# Patient Record
Sex: MALE | Race: WHITE | NOT HISPANIC OR LATINO | Employment: FULL TIME | URBAN - METROPOLITAN AREA
[De-identification: names, ages, dates, MRNs, and addresses within clinical notes are randomized per-mention and may not be internally consistent; named-entity substitution may affect disease eponyms.]

---

## 2021-04-16 ENCOUNTER — BULK ORDERING (OUTPATIENT)
Dept: CASE MANAGEMENT | Facility: OTHER | Age: 41
End: 2021-04-16

## 2021-04-16 DIAGNOSIS — Z23 IMMUNIZATION DUE: ICD-10-CM

## 2021-08-18 ENCOUNTER — OFFICE VISIT (OUTPATIENT)
Dept: ORTHOPEDIC SURGERY | Facility: CLINIC | Age: 41
End: 2021-08-18

## 2021-08-18 VITALS — WEIGHT: 260 LBS | TEMPERATURE: 98.4 F | HEIGHT: 71 IN | BODY MASS INDEX: 36.4 KG/M2

## 2021-08-18 DIAGNOSIS — M10.9 GOUTY ARTHRITIS OF BOTH FEET: ICD-10-CM

## 2021-08-18 DIAGNOSIS — R52 PAIN: ICD-10-CM

## 2021-08-18 DIAGNOSIS — M10.9 GOUTY ARTHRITIS OF BOTH ANKLES: Primary | ICD-10-CM

## 2021-08-18 PROCEDURE — 73610 X-RAY EXAM OF ANKLE: CPT | Performed by: ORTHOPAEDIC SURGERY

## 2021-08-18 PROCEDURE — 99203 OFFICE O/P NEW LOW 30 MIN: CPT | Performed by: ORTHOPAEDIC SURGERY

## 2021-08-18 NOTE — PROGRESS NOTES
"   New Patient Complaint      Patient: Murray Estrada  YOB: 1980 40 y.o. male  Medical Record Number: [id number]    Chief Complaints: My ankle was hurting    History of Present Illness: Patient has had episodes off and on for last 2 to 3 years of intermittent discomfort and pain in both ankles as well as intermittently in his feet and great toes but none that occur at the same time.  He is not had any specific injury and has been treated at [facility] as well as seen in urgent care several times and placed on Medrol dose packs which set alleviate symptoms.  He is not currently having any complaints in either foot or ankle.  However the main reason he made the appointment several weeks ago was for pain in his ankles mainly the left.        HPI    Allergies: No Known Allergies    Medications:   No current outpatient medications on file prior to visit.     No current facility-administered medications on file prior to visit.       No past medical history on file.  Past Surgical History:   Procedure Laterality Date   • CYST REMOVAL Right      Social History     Occupational History   • Not on file   Tobacco Use   • Smoking status: Never Smoker   • Smokeless tobacco: Current User   Vaping Use   • Vaping Use: Never used   Substance and Sexual Activity   • Alcohol use: Yes     Comment: SOCIALLY   • Drug use: Never   • Sexual activity: Not on file      Social History     Social History Narrative   • Not on file     No family history on file.    Review of Systems: 14 point review of systems performed, positive pertinent findings identified in HPI. All remaining systems negative     Review of Systems      Physical Exam:   Vitals:    08/18/21 1100   Temp: 98.4 °F (36.9 °C)   Weight: 118 kg (260 lb)   Height: 180.3 cm (71\")   PainSc: 0-No pain     Physical Exam   Constitutional: pleasant, well developed   Eyes: sclera non icteric  Hearing : adequate for exam  Cardiovascular: palpable pulses in " bilaeral feet, bilateral calves/ thighs NT without sign of DVT  Respiratoy: breathing unlabored   Neurological: grossly sensate to LT throughout bilateral LEs  Psychiatric: oriented with normal mood and affect.   Lymphatic: No palpable popliteal lymphadenopathy bilateral LEs  Skin: intact throughout bilateral legs/feet  Musculoskeletal: He is ambulating without antalgic gait.  Neither ankle showed any warmth erythema or appreciable effusion with no focal tenderness there was no focal tenderness over dorsum of the midfoot.  He appeared to probably have some chronic hallux valgus deformity with some mild arthritic change around the first MTP joints but no warmth erythema or irritability today.  Physical Exam  Ortho Exam    Radiology: 3 views of both ankles ordered evaluate pain reviewed and no prior x-rays available for comparison talus appears to be well-seated within the mortise I do not see any obvious fractures or malalignment.  On the right there may be some very slight anterior spurring.  On the lateral view there does appear to be some probable arthritic change of the first toes.    Assessment/Plan: Multiple intermittent arthralgias of both feet and ankles.    Reviewed with him that I would see any obvious sign of infection and nothing active going on at this point and has not had any injury.    Most likely is having some type of intermittent gouty flares or other metabolic arthropathy but nothing that I see to do from orthopedic standpoint at this time.    We discussed treatment options going forward and recommend that he have a rheumatologic evaluation for further treatment and recommendations going forward and referral was made to rheumatology.    We will plan on seeing him back as needed